# Patient Record
Sex: FEMALE | Race: BLACK OR AFRICAN AMERICAN | URBAN - METROPOLITAN AREA
[De-identification: names, ages, dates, MRNs, and addresses within clinical notes are randomized per-mention and may not be internally consistent; named-entity substitution may affect disease eponyms.]

---

## 2019-09-09 ENCOUNTER — OFFICE VISIT (OUTPATIENT)
Dept: OBGYN CLINIC | Age: 39
End: 2019-09-09

## 2019-09-09 VITALS
DIASTOLIC BLOOD PRESSURE: 98 MMHG | BODY MASS INDEX: 27.82 KG/M2 | WEIGHT: 157 LBS | SYSTOLIC BLOOD PRESSURE: 151 MMHG | HEIGHT: 63 IN

## 2019-09-09 DIAGNOSIS — L29.2 VULVAR ITCHING: ICD-10-CM

## 2019-09-09 DIAGNOSIS — Z01.419 ENCOUNTER FOR GYNECOLOGICAL EXAMINATION (GENERAL) (ROUTINE) WITHOUT ABNORMAL FINDINGS: Primary | ICD-10-CM

## 2019-09-09 RX ORDER — ROSUVASTATIN CALCIUM 20 MG/1
40 TABLET, COATED ORAL
COMMUNITY

## 2019-09-09 RX ORDER — INSULIN LISPRO 100 [IU]/ML
INJECTION, SOLUTION INTRAVENOUS; SUBCUTANEOUS
COMMUNITY

## 2019-09-09 RX ORDER — INSULIN DEGLUDEC 100 U/ML
INJECTION, SOLUTION SUBCUTANEOUS
COMMUNITY

## 2019-09-09 RX ORDER — CLOBETASOL PROPIONATE 0.5 MG/G
OINTMENT TOPICAL 2 TIMES DAILY
Qty: 60 G | Refills: 1 | Status: SHIPPED | OUTPATIENT
Start: 2019-09-09

## 2019-09-09 RX ORDER — TRIAMCINOLONE ACETONIDE 5 MG/G
OINTMENT TOPICAL 2 TIMES DAILY
Qty: 60 G | Refills: 3 | Status: SHIPPED | OUTPATIENT
Start: 2019-09-09

## 2019-09-09 NOTE — PROGRESS NOTES
Isaura Spence is a ,  42 y.o. female [address]   who presents for her annual checkup. She is having odor with intercourse and itching on her vulva. She states she had a biopsy done of her vulva and it revealed contact dermatitis. Bx done by [facility] 2018 - results available    With regard to the Gardisil vaccine, she is older than the FDA approved age to receive it. Menstrual status:    Her periods are absent in flow. Dr. Yadiel Mitchell did a [address] in [id number]. She denies dysmenorrhea. She reports no premenstrual symptoms. Contraception:    The current method of family planning is status post hysterectomy. Sexual history:    She  reports that she currently engages in sexual activity and has had partner(s) who are Male. She reports using the following method of birth control/protection: Surgical.    Medical conditions:    Since her last annual GYN exam about 1 year ago per patient, she has not the following changes in her health history: none. Pap and Mammogram History:    Her most recent Pap smear was normal and she thinks a few years ago. The patient has never had a mammogram.    The patient does not have a family history of breast cancer.     Past Medical History:   Diagnosis Date    CAD (coronary artery disease)     Diabetes ([facility] [address]     Hypertension     Vitamin D deficiency      Past Surgical History:   Procedure Laterality Date    HX BREAST REDUCTION      HX CHOLECYSTECTOMY      HX OTHER SURGICAL  2016    stent in artery    HX TOTAL LAPAROSCOPIC HYSTERECTOMY  [id number]       Current Outpatient Medications   Medication Sig Dispense Refill    amlodipine besylate (NORVASC PO) amlodipine      insulin lispro (HUMALOG KWIKPEN INSULIN) 100 unit/mL kwikpen Humalog KwikPen (U-100) Insulin 100 unit/mL subcutaneous      nebivolol HCl (BYSTOLIC PO) Bystolic      insulin degludec (TRESIBA U-100 INSULIN) 100 unit/mL soln Tresiba U-100 Insulin      rosuvastatin (CRESTOR) 20 mg tablet Take 20 mg by mouth nightly. Allergies: Patient has no allergy information on record. Social History     Socioeconomic History    Marital status: SINGLE     Spouse name: Not on file    Number of children: Not on file    Years of education: Not on file    Highest education level: Not on file   Occupational History    Not on file   Social Needs    Financial resource strain: Not on file    Food insecurity:     Worry: Not on file     Inability: Not on file    Transportation needs:     Medical: Not on file     Non-medical: Not on file   Tobacco Use    Smoking status: Never Smoker    Smokeless tobacco: Never Used   Substance and Sexual Activity    Alcohol use: Not on file    Drug use: Not on file    Sexual activity: Yes     Partners: Male     Birth control/protection: Surgical     Comment: [address]   Lifestyle    Physical activity:     Days per week: Not on file     Minutes per session: Not on file    Stress: Not on file   Relationships    Social connections:     Talks on phone: Not on file     Gets together: Not on file     Attends [employer] service: Not on file     Active member of club or organization: Not on file     Attends meetings of clubs or organizations: Not on file     Relationship status: Not on file    Intimate partner violence:     Fear of current or ex partner: Not on file     Emotionally abused: Not on file     Physically abused: Not on file     Forced sexual activity: Not on file   Other Topics Concern    Not on file   Social History Narrative    Not on file     Tobacco History:  reports that she has never smoked. She has never used smokeless tobacco.  Alcohol Abuse:  has no alcohol history on file. Drug Abuse:  has no drug history on file. There is no problem list on file for this patient.       Review of Systems - History obtained from the patient  Constitutional: negative for weight loss, fever, night sweats  HEENT: negative for hearing loss, earache, congestion, snoring, sorethroat  CV: negative for chest pain, palpitations, edema  Resp: negative for cough, shortness of breath, wheezing  GI: negative for change in bowel habits, abdominal pain, black or bloody stools  : negative for frequency, dysuria, hematuria, vaginal discharge  MSK: negative for back pain, joint pain, muscle pain  Breast: negative for breast lumps, nipple discharge, galactorrhea  Skin :negative for itching, rash, hives  Neuro: negative for dizziness, headache, confusion, weakness  Psych: negative for anxiety, depression, change in mood  Heme/lymph: negative for bleeding, bruising, pallor    Physical Exam    Visit Vitals  BP (!) 151/98   Ht 5' 3\" (1.6 m)   Wt 157 lb (71.2 kg)   BMI 27.81 kg/m²       Constitutional  · Appearance: well-nourished, well developed, alert, in no acute distress    HENT  · Head and Face: appears normal    Neck  · Inspection/Palpation: normal appearance, no masses or tenderness  · Lymph Nodes: no lymphadenopathy present  · Thyroid: gland size normal, nontender, no nodules or masses present on palpation    Chest  · Respiratory Effort: breathing normal  · Auscultation: normal breath sounds    Cardiovascular  · Heart:  · Auscultation: regular rate and rhythm without murmur    Breasts  · Inspection of Breasts: breasts symmetrical, no skin changes, no discharge present, nipple appearance normal, no skin retraction present  · Palpation of Breasts and Axillae: no masses present on palpation, no breast tenderness  · Axillary Lymph Nodes: no lymphadenopathy present    Gastrointestinal  · Abdominal Examination: abdomen non-tender to palpation, normal bowel sounds, no masses present  · Liver and spleen: no hepatomegaly present, spleen not palpable  · Hernias: no hernias identified    Genitourinary  · External Genitalia: normal appearance for age, no discharge present, no tenderness present, no inflammatory lesions present, no masses present, no atrophy present  · Vagina: normal vaginal vault without central or paravaginal defects, no discharge present, no inflammatory lesions present, no masses present  · Bladder: non-tender to palpation  · Urethra: appears normal  · Cervix: absent  · Uterus: absent  · Adnexa: no adnexal tenderness present, no adnexal masses present  · Perineum: perineum within normal limits, no evidence of trauma, no rashes or skin lesions present  · Anus: anus within normal limits, no hemorrhoids present  · Inguinal Lymph Nodes: no lymphadenopathy present    Skin  · General Inspection: no rash, no lesions identified    Neurologic/Psychiatric  · Mental Status:  · Orientation: grossly oriented to person, place and time  · Mood and Affect: mood normal, affect appropriate    .   Assessment:  Routine gynecologic examination  Vulvar itching - bx dermitis    Plan:  Counseled re: diet, exercise, healthy lifestyle  Return for yearly wellness visits  Pap  nuswab to r/o yeast  Clobetasol to area wean to triamcinolone

## 2019-09-09 NOTE — PATIENT INSTRUCTIONS
Well Visit, Ages 25 to 48: Care Instructions  Your Care Instructions    Physical exams can help you stay healthy. Your doctor has checked your overall health and may have suggested ways to take good care of yourself. He or she also may have recommended tests. At home, you can help prevent illness with healthy eating, regular exercise, and other steps. Follow-up care is a key part of your treatment and safety. Be sure to make and go to all appointments, and call your doctor if you are having problems. It's also a good idea to know your test results and keep a list of the medicines you take. How can you care for yourself at home? · Reach and stay at a healthy weight. This will lower your risk for many problems, such as obesity, diabetes, heart disease, and high blood pressure. · Get at least 30 minutes of physical activity on most days of the week. Walking is a good choice. You also may want to do other activities, such as running, swimming, cycling, or playing tennis or team sports. Discuss any changes in your exercise program with your doctor. · Do not smoke or allow others to smoke around you. If you need help quitting, talk to your doctor about stop-smoking programs and medicines. These can increase your chances of quitting for good. · Talk to your doctor about whether you have any risk factors for sexually transmitted infections (STIs). Having one sex partner (who does not have STIs and does not have sex with anyone else) is a good way to avoid these infections. · Use birth control if you do not want to have children at this time. Talk with your doctor about the choices available and what might be best for you. · Protect your skin from too much sun. When you're outdoors from 10 a.m. to 4 p.m., stay in the shade or cover up with clothing and a hat with a wide brim. Wear sunglasses that block UV rays. Even when it's cloudy, put broad-spectrum sunscreen (SPF 30 or higher) on any exposed skin.   · See a dentist one or two times a year for checkups and to have your teeth cleaned. · Wear a seat belt in the car. Follow your doctor's advice about when to have certain tests. These tests can spot problems early. For everyone  · Cholesterol. Have the fat (cholesterol) in your blood tested after age 21. Your doctor will tell you how often to have this done based on your age, family history, or other things that can increase your risk for heart disease. · Blood pressure. Have your blood pressure checked during a routine doctor visit. Your doctor will tell you how often to check your blood pressure based on your age, your blood pressure results, and other factors. · Vision. Talk with your doctor about how often to have a glaucoma test.  · Diabetes. Ask your doctor whether you should have tests for diabetes. · Colon cancer. Your risk for colorectal cancer gets higher as you get older. Some experts say that adults should start regular screening at age 48 and stop at age 76. Others say to start before age 48 or continue after age 76. Talk with your doctor about your risk and when to start and stop screening. For women  · Breast exam and mammogram. Talk to your doctor about when you should have a clinical breast exam and a mammogram. Medical experts differ on whether and how often women under 50 should have these tests. Your doctor can help you decide what is right for you. · Cervical cancer screening test and pelvic exam. Begin with a Pap test at age 24. The test often is part of a pelvic exam. Starting at age 27, you may choose to have a Pap test, an HPV test, or both tests at the same time (called co-testing). Talk with your doctor about how often to have testing. · Tests for sexually transmitted infections (STIs). Ask whether you should have tests for STIs. You may be at risk if you have sex with more than one person, especially if your partners do not wear condoms.   For men  · Tests for sexually transmitted infections (STIs). Ask whether you should have tests for STIs. You may be at risk if you have sex with more than one person, especially if you do not wear a condom. · Testicular cancer exam. Ask your doctor whether you should check your testicles regularly. · Prostate exam. Talk to your doctor about whether you should have a blood test (called a PSA test) for prostate cancer. Experts differ on whether and when men should have this test. Some experts suggest it if you are older than 39 and are -American or have a father or brother who got prostate cancer when he was younger than 72. When should you call for help? Watch closely for changes in your health, and be sure to contact your doctor if you have any problems or symptoms that concern you. Where can you learn more? Go to http://roma-minda.info/. Enter P072 in the search box to learn more about \"Well Visit, Ages 25 to 48: Care Instructions. \"  Current as of: December 13, 2018  Content Version: 12.1  © 9255-9266 Healthwise, Incorporated. Care instructions adapted under license by [employer] (which disclaims liability or warranty for this information). If you have questions about a medical condition or this instruction, always ask your healthcare professional. [place] [address] any warranty or liability for your use of this information.

## 2019-09-11 LAB
A VAGINAE DNA VAG QL NAA+PROBE: ABNORMAL SCORE
BVAB2 DNA VAG QL NAA+PROBE: ABNORMAL SCORE
C ALBICANS DNA VAG QL NAA+PROBE: NEGATIVE
C GLABRATA DNA VAG QL NAA+PROBE: POSITIVE
CYTOLOGIST CVX/VAG CYTO: NORMAL
CYTOLOGY CVX/VAG DOC CYTO: NORMAL
CYTOLOGY CVX/VAG DOC THIN PREP: NORMAL
CYTOLOGY HISTORY:: NORMAL
DX ICD CODE: NORMAL
LABCORP, 190119: NORMAL
Lab: NORMAL
Lab: NORMAL
MEGA1 DNA VAG QL NAA+PROBE: ABNORMAL SCORE
OTHER STN SPEC: NORMAL
STAT OF ADQ CVX/VAG CYTO-IMP: NORMAL
T VAGINALIS RRNA SPEC QL NAA+PROBE: NEGATIVE

## 2019-09-11 RX ORDER — TERCONAZOLE 4 MG/G
1 CREAM VAGINAL
Qty: 45 G | Refills: 0 | Status: SHIPPED | OUTPATIENT
Start: 2019-09-11 | End: 2021-08-27

## 2019-09-11 NOTE — PROGRESS NOTES
Very resistant type of yeast. Sending rx. If not better in a few weeks come back to see me to try something else.  Can still use steroid externally

## 2019-09-12 NOTE — PROGRESS NOTES
Left message for patient to call office in reference to lab results and Drs recommendations. Patient does not have mychart.

## 2019-09-12 NOTE — PROGRESS NOTES
Patient left a message regarding her lab results and ok to leave a detailed message regarding the results on her cell phone. This nurse called the patient back and left a detailed message as requested regarding her MD reviewed lab results and recommendations. Patient advised of prescription sent by MD to her pharmacy. Patient advised to call back if her symptoms not cleared up in a few weeks.

## 2020-03-25 ENCOUNTER — OFFICE VISIT (OUTPATIENT)
Dept: OBGYN CLINIC | Age: 40
End: 2020-03-25

## 2020-03-25 VITALS
WEIGHT: 158 LBS | BODY MASS INDEX: 28 KG/M2 | DIASTOLIC BLOOD PRESSURE: 107 MMHG | SYSTOLIC BLOOD PRESSURE: 156 MMHG | HEIGHT: 63 IN

## 2020-03-25 DIAGNOSIS — N75.1 BARTHOLIN'S GLAND ABSCESS: Primary | ICD-10-CM

## 2020-03-25 NOTE — PROGRESS NOTES
Vaginal cyst evaluation    Mariama Lee is a 42 y.o. female  No LMP recorded. Patient has had a hysterectomy. who presents with a possible vaginal cyst on her right labia. She noticed it 3 days ago. The lesion has not shown the following change: increasing diameter, increasing thickness, increasing number of lesions. No new lesions have developed. She reports the following associated symptoms: pain. She has significant pain when she sits down. She denies the following associated symptoms: redness, drainage, vaginal itching/odor. Aggravating factors: none. Alleviating factors: none. She states she has a hx of skin problems and recurrent yeast due to diabetes. She was +glabrata 9/2019      Past Medical History:   Diagnosis Date    CAD (coronary artery disease)     Diabetes ([address]     Hypertension     Vitamin D deficiency      Past Surgical History:   Procedure Laterality Date    HX BREAST REDUCTION  2010    HX CHOLECYSTECTOMY  2002    HX OTHER SURGICAL  2016    stent in artery    HX TOTAL LAPAROSCOPIC HYSTERECTOMY  0948-1514     Social History     Occupational History    Not on file   Tobacco Use    Smoking status: Never Smoker    Smokeless tobacco: Never Used   Substance and Sexual Activity    Alcohol use: Not on file    Drug use: Not on file    Sexual activity: Yes     Partners: Male     Birth control/protection: Surgical     Comment: [address]     Family History   Problem Relation Age of Onset    Diabetes Mother     Hypertension Mother     Diabetes Father     Hypertension Father        Not on File  Prior to Admission medications    Medication Sig Start Date End Date Taking?  Authorizing Provider   amlodipine besylate (NORVASC PO) amlodipine   Yes Provider, Historical   insulin lispro (HUMALOG KWIKPEN INSULIN) 100 unit/mL kwikpen Humalog KwikPen (U-100) Insulin 100 unit/mL subcutaneous   Yes Provider, Historical   nebivolol HCl (BYSTOLIC PO) Bystolic   Yes Provider, Historical   insulin degludec (TRESIBA U-100 INSULIN) 100 unit/mL soln Tresiba U-100 Insulin   Yes Provider, Historical   rosuvastatin (CRESTOR) 20 mg tablet Take 20 mg by mouth nightly. Yes Provider, Historical   terconazole (TERAZOL 7) 0.4 % vaginal cream Insert 1 Applicator into vagina nightly. 9/11/19   Rios Elena MD   clobetasol (TEMOVATE) 0.05 % ointment Apply  to affected area two (2) times a day. For 1 week, daily x 1 week, every other day x 1 week then start triamcinolone 9/9/19   Rios Elena MD   triamcinolone acetonide (KENALOG) 0.5 % ointment Apply  to affected area two (2) times a day.  use thin layer 9/9/19   Rios Elena MD        Review of Systems: History obtained from the patient  Constitutional: negative for weight loss, fever, night sweats  GI: negative for change in bowel habits, abdominal pain, black or bloody stools  : negative for frequency, dysuria, hematuria, vaginal discharge  MSK: negative for back pain, joint pain, muscle pain  Skin: negative for itching, rash, hives elsewhere  Neuro: negative for dizziness, headache, confusion, weakness  Psych: negative for anxiety, depression, change in mood  Heme/lymph: negative for bleeding, bruising, pallor    Objective:  Visit Vitals  BP (!) 156/107   Ht 5' 3\" (1.6 m)   Wt 158 lb (71.7 kg)   BMI 27.99 kg/m²       Physical Exam:   PHYSICAL EXAMINATION    Constitutional  · Appearance: well-nourished, well developed, alert, in no acute distress    Gastrointestinal  · Abdominal Examination: abdomen non-tender to palpation, normal bowel sounds, no masses present  · Liver and spleen: no hepatomegaly present, spleen not palpable  · Hernias: no hernias identified    Genitourinary  · External Genitalia: normal appearance for age, no discharge present, no tenderness present, no inflammatory lesions present,  no atrophy present, right bartholins abscess noted 3cm  · Vagina: normal vaginal vault without central or paravaginal defects, no discharge present, no inflammatory lesions present, no masses present  · Bladder: non-tender to palpation  · Urethra: appears normal  · Perineum: perineum within normal limits, no evidence of trauma, no rashes or skin lesions present  · Anus: anus within normal limits, no hemorrhoids present  · Inguinal Lymph Nodes: no lymphadenopathy present    Skin  · General Inspection: no rash, no lesions identified    Neurologic/Psychiatric  · Mental Status:  · Orientation: grossly oriented to person, place and time  · Mood and Affect: mood normal, affect appropriate    Assessment:   Elevated BP  bartholins abscess    Plan:   I&D bartholin abscess - unable to fit Word cath  Advised tid sitz baths  Virtual fu in 2 weeks      RTO prn if symptoms persist or worsen. Instructions given to pt. Handouts given to pt. Procedure note: Bartholin abcess drainage and placement of Word catheter    Freddy Bermudez is a 42 y.o. female [address] who presents today with a tender and indurated 3 cm bartholin's abscess of the rightvulva. She has elected to have a word catheter placement today. The risks, benefits and assets of the procedure were discussed. Her questions were answered, informed consent was obtained, the informed consent document was signed, and she had no further questions. PROCEDURE: The vulva was prepped with Zephiran. Following the prep, 3 cc's of 1% Xylocaine was injected into the skin over the lesion area. After adequate anesthesia was obtained, a number 11 scalpel was used to open the abscess. Approximately 5 cc's of purulent fluid was released. The procedure was continued with attempted placement of a Word catheter. The catheter would not stay in the small wound. Bleeding was minimal.   POST PROCEDURE: The patient tolerated the procedure well. There were no complications. She reports significant relief in her symptoms following the procedure.

## 2020-03-25 NOTE — PATIENT INSTRUCTIONS
Bartholin Gland Cyst: Care Instructions  Your Care Instructions    The Bartholin glands are in a woman's vulva. This is the area around the vagina. The glands are normally about the size of a pea. They provide fluid to the vulvar area through a small opening. If the opening is blocked, the gland swells with fluid and forms a cyst. You can have a cyst for years with no symptoms. But if a cyst gets infected by bacteria, it can grow and become red and painful. This is called an abscess. Opening and draining the cyst usually cures the infection. You may have had a small tube (catheter) placed into the cyst or had minor surgery to let the cyst drain. The tube will usually be left in for at least 4 weeks. Your doctor may do a lab test to find out what kind of bacteria caused the infection. You may get antibiotics to kill the bacteria. You may have some drainage from the cyst for a few weeks. The gland should return to normal after the infection clears up. Follow-up care is a key part of your treatment and safety. Be sure to make and go to all appointments, and call your doctor if you are having problems. It's also a good idea to know your test results and keep a list of the medicines you take. How can you care for yourself at home? · If your doctor prescribed antibiotics, take them as directed. Do not stop taking them just because you feel better. You need to take the full course of antibiotics. · Sit in a few inches of warm water (sitz bath) 3 times a day and after bowel movements. The warm water helps the area heal and eases discomfort. · Take an over-the-counter pain medicine such as acetaminophen (Tylenol), ibuprofen (Advil, Motrin), or naproxen (Aleve). Read and follow all instructions on the label. · Do not take two or more pain medicines at the same time unless the doctor told you to. Many pain medicines have acetaminophen, which is Tylenol. Too much acetaminophen (Tylenol) can be harmful.   · Wear panty liners or pads if you have discharge from the draining cyst.  · If you are sexually active, avoid sex until:  ? You have finished the antibiotics. ? The area has healed. · If you had a catheter placed in the cyst to help it drain, follow your doctor's instructions for activities until the tube comes out. When should you call for help? Call your doctor now or seek immediate medical care if:    · You have symptoms of a new or worse infection, such as:  ? Increased pain, swelling, warmth, or redness. ? Red streaks leading from the area. ? Pus draining from the area. ? A fever.    Watch closely for changes in your health, and be sure to contact your doctor if:    · The catheter falls out.     · You are not getting better as expected. Where can you learn more? Go to http://roma-minda.info/  Enter H276 in the search box to learn more about \"Bartholin Gland Cyst: Care Instructions. \"  Current as of: November 7, 2019Content Version: 12.4  © 0800-0974 Healthwise, Incorporated. Care instructions adapted under license by [employer] (which disclaims liability or warranty for this information). If you have questions about a medical condition or this instruction, always ask your healthcare professional. [address] any warranty or liability for your use of this information.

## 2020-04-01 ENCOUNTER — VIRTUAL VISIT (OUTPATIENT)
Dept: OBGYN CLINIC | Age: 40
End: 2020-04-01

## 2020-04-01 DIAGNOSIS — N89.8 VAGINAL DISCHARGE: ICD-10-CM

## 2020-04-01 DIAGNOSIS — N89.8 VAGINAL ITCHING: Primary | ICD-10-CM

## 2020-04-01 RX ORDER — TERCONAZOLE 4 MG/G
1 CREAM VAGINAL
Qty: 45 G | Refills: 0 | Status: SHIPPED | OUTPATIENT
Start: 2020-04-01 | End: 2021-08-27

## 2020-04-01 NOTE — PROGRESS NOTES
"[employer] Video visit  Andrew Martino is a 42 y.o. female who was seen by synchronous (real-time) audio-video technology on 4/1/2020. Vaginitis evaluation    Chief Complaint   Vaginitis      HPI  42 y.o. female complains of thick, chunky, white vaginal discharge and itching for 3 days. No LMP recorded. Patient has had a hysterectomy. She denies additional symptoms at this time. She was seen last week and had I&D of bartholin's abscess - patient states symptoms are not in that area  The patient denies aggravating factors. She is not concerned about possible STI exposure at this time. She denies exposure to new chemicals ot hygenic agents  Previous treatment included: none  Patient was seen 3/25/20 for I&D bartholin cyst.   Hx of glabrata on 9/2019  No recent abx but she is diabetic and BS has been out of control. Past Medical History:   Diagnosis Date    CAD (coronary artery disease)     Diabetes ([facility] [address]     Hypertension     Vitamin D deficiency      Past Surgical History:   Procedure Laterality Date    HX BREAST REDUCTION  2010    HX CHOLECYSTECTOMY  2002    HX OTHER SURGICAL  2016    stent in artery    HX TOTAL LAPAROSCOPIC HYSTERECTOMY  2532-6326     Social History     Occupational History    Not on file   Tobacco Use    Smoking status: Never Smoker    Smokeless tobacco: Never Used   Substance and Sexual Activity    Alcohol use: Not on file    Drug use: Not on file    Sexual activity: Yes     Partners: Male     Birth control/protection: Surgical     Comment: [address]     Family History   Problem Relation Age of Onset    Diabetes Mother     Hypertension Mother     Diabetes Father     Hypertension Father         Not on File  Prior to Admission medications    Medication Sig Start Date End Date Taking? Authorizing Provider   terconazole (TERAZOL 7) 0.4 % vaginal cream Insert 1 Applicator into vagina nightly.  9/11/19  Yes Eugenie Boeck, MD   amlodipine besylate (Lucía Gills PO) amlodipine   Yes Provider, Historical   insulin lispro (HUMALOG KWIKPEN INSULIN) 100 unit/mL kwikpen Humalog KwikPen (U-100) Insulin 100 unit/mL subcutaneous   Yes Provider, Historical   nebivolol HCl (BYSTOLIC PO) Bystolic   Yes Provider, Historical   insulin degludec (TRESIBA U-100 INSULIN) 100 unit/mL soln Tresiba U-100 Insulin   Yes Provider, Historical   rosuvastatin (CRESTOR) 20 mg tablet Take 20 mg by mouth nightly. Yes Provider, Historical   clobetasol (TEMOVATE) 0.05 % ointment Apply  to affected area two (2) times a day. For 1 week, daily x 1 week, every other day x 1 week then start triamcinolone 9/9/19  Yes Martin Sharma MD   triamcinolone acetonide (KENALOG) 0.5 % ointment Apply  to affected area two (2) times a day. use thin layer 9/9/19  Yes Martin Sharma MD                      Review of Systems - History obtained from the patient  Constitutional: negative for weight loss, fever, night sweats  Breast: negative for breast lumps, nipple discharge, galactorrhea  GI: negative for change in bowel habits, abdominal pain, black or bloody stools  : negative for frequency, dysuria, hematuria  MSK: negative for back pain, joint pain, muscle pain  Skin: negative for itching, rash, hives  Neuro: negative for dizziness, headache, confusion, weakness  Psych: negative for anxiety, depression, change in mood  Heme/lymph: negative for bleeding, bruising, pallor       Objective: There were no vitals taken for this visit.     Physical Exam:   PHYSICAL EXAMINATION        General: alert, cooperative, no distress   Mental  status: mental status: alert, oriented to person, place, and time, normal mood, behavior, speech, dress, motor activity, and thought processes   Resp: resp: normal effort and no respiratory distress   Neuro: neuro: no gross deficits   Skin: skin: no discoloration or lesions of concern on visible areas   Due to this being a TeleHealth evaluation, many elements of the physical examination are unable to be assessed. Assessment:   Vaginal itching/discharge in uncontrolled diabetic - most likely yeast  She has a known hx of candida glabrata    Plan:   Treatment: Terazol 7  ROV prn if symptoms persist or worsen. We discussed the expected course, resolution and complications of the diagnosis(es) in detail. Medication risks, benefits, costs, interactions, and alternatives were discussed as indicated. I advised her to contact the office if her condition worsens, changes or fails to improve as anticipated. She expressed understanding with the diagnosis(es) and plan. Pursuant to the emergency declaration under the [facility] waiver authority and the [employer] Act, this Virtual  Visit was conducted, with patient's consent, to reduce the patient's risk of exposure to COVID-19 and provide continuity of care for an established patient. Services were provided through a video synchronous discussion virtually to substitute for in-person clinic visit.

## 2021-08-27 ENCOUNTER — OFFICE VISIT (OUTPATIENT)
Dept: CARDIOLOGY CLINIC | Age: 41
End: 2021-08-27

## 2021-08-27 VITALS
HEART RATE: 107 BPM | DIASTOLIC BLOOD PRESSURE: 102 MMHG | BODY MASS INDEX: 25.52 KG/M2 | WEIGHT: 144 LBS | OXYGEN SATURATION: 98 % | HEIGHT: 63 IN | SYSTOLIC BLOOD PRESSURE: 196 MMHG

## 2021-08-27 DIAGNOSIS — E78.5 DYSLIPIDEMIA: ICD-10-CM

## 2021-08-27 DIAGNOSIS — R73.09 ELEVATED GLUCOSE: ICD-10-CM

## 2021-08-27 DIAGNOSIS — I10 HYPERTENSION, UNSPECIFIED TYPE: Primary | ICD-10-CM

## 2021-08-27 PROCEDURE — 93000 ELECTROCARDIOGRAM COMPLETE: CPT | Performed by: SPECIALIST

## 2021-08-27 PROCEDURE — 99204 OFFICE O/P NEW MOD 45 MIN: CPT | Performed by: SPECIALIST

## 2021-08-27 RX ORDER — AMLODIPINE BESYLATE 10 MG/1
10 TABLET ORAL DAILY
Qty: 30 TABLET | Refills: 0
Start: 2021-08-27

## 2021-08-27 RX ORDER — PRASUGREL 10 MG/1
10 TABLET, FILM COATED ORAL
COMMUNITY

## 2021-08-27 RX ORDER — AZILSARTAN KAMEDOXOMIL AND CHLORTHALIDONE 40; 25 MG/1; MG/1
TABLET ORAL DAILY
COMMUNITY

## 2021-08-27 RX ORDER — TERAZOSIN 2 MG/1
2 CAPSULE ORAL
Qty: 30 CAPSULE | Refills: 1 | Status: SHIPPED | OUTPATIENT
Start: 2021-08-27 | End: 2021-09-23 | Stop reason: SDUPTHER

## 2021-08-27 RX ORDER — SPIRONOLACTONE 50 MG/1
50 TABLET, FILM COATED ORAL DAILY
COMMUNITY

## 2021-08-27 NOTE — PROGRESS NOTES
Chief Complaint   Patient presents with    New Patient    Coronary Artery Disease    Hypertension     Visit Vitals  BP (!) 196/102 (BP 1 Location: Left upper arm, BP Patient Position: Sitting, BP Cuff Size: Adult)   Pulse (!) 107   Ht 5' 3\" (1.6 m)   Wt 144 lb (65.3 kg)   SpO2 98%   BMI 25.51 kg/m²     Chest pain slight; left sided upper chest  Left arm tingling at times  SOB denied   Palpitations denied   Swelling in hands/feet denied   Dizziness denied   Recent hospital stays denied   Refills denied     Dr Abeba Qureshi   BP skyrocketing  178/100 at work  167/104 at home    Hx 2 stents, balloon  2019 Dr. Lizzie Morataya did 2nd Oct 2019  Balloon May 2020  At [address] [place] [address], uncle, Grandmother on mom's side hx stents    Orders for See me in one month with echo and renal artery dopplers    per Dr. Alexia Goss.   Dx:

## 2021-08-27 NOTE — PROGRESS NOTES
Ginny Britt MD. [facility] [place]              Patient: Neelima Baer  : 1980      Today's Date: 2021          HISTORY OF PRESENT ILLNESS:     History of Present Illness:  She is self referred for HTN. Her BP has been 160/100 or higher at home. Has had HA's associated with high BP's. BP has been high for years and gradually increasing. She has been intolerant of a few meds (clonidine caused GI complaints). No CP or SOB. She had been seeing Dr. Monique Acuña and he made several med changes but her BP has remained high. PAST MEDICAL HISTORY:     Past Medical History:   Diagnosis Date    CAD (coronary artery disease)     Diabetes ([address]     Dyslipidemia     Hypertension     Vitamin D deficiency        Past Surgical History:   Procedure Laterality Date    HX BREAST REDUCTION      HX CHOLECYSTECTOMY      HX OTHER SURGICAL  2016    stent in artery    HX TOTAL LAPAROSCOPIC HYSTERECTOMY  [id number]         MEDICATIONS:     Current Outpatient Medications   Medication Sig Dispense Refill    evolocumab (REPATHA SURECLICK SC) by SubCUTAneous route every fourteen (14) days.  prasugreL (Effient) 10 mg tablet Take 10 mg by mouth.  azilsartan med-chlorthalidone (Edarbyclor) 40-25 mg per tablet Take  by mouth daily.  spironolactone (ALDACTONE) 50 mg tablet Take 50 mg by mouth daily.  amLODIPine (Norvasc) 10 mg tablet Take 1 Tablet by mouth daily. 30 Tablet 0    insulin lispro (HUMALOG KWIKPEN INSULIN) 100 unit/mL kwikpen Humalog KwikPen (U-100) Insulin 100 unit/mL subcutaneous      nebivolol HCl (BYSTOLIC PO) 40 mg daily.  insulin degludec (TRESIBA U-100 INSULIN) 100 unit/mL soln Tresiba U-100 Insulin      rosuvastatin (CRESTOR) 20 mg tablet Take 40 mg by mouth nightly.  clobetasol (TEMOVATE) 0.05 % ointment Apply  to affected area two (2) times a day.  For 1 week, daily x 1 week, every other day x 1 week then start triamcinolone 60 g 1    triamcinolone acetonide (KENALOG) 0.5 % ointment Apply  to affected area two (2) times a day. use thin layer 60 g 3       No Known Allergies        SOCIAL HISTORY:     Social History     Tobacco Use    Smoking status: Never Smoker    Smokeless tobacco: Never Used   Substance Use Topics    Alcohol use: Not Currently    Drug use: Not on file       FAMILY HISTORY:     Family History   Problem Relation Age of Onset    Diabetes Mother     Hypertension Mother     Diabetes Father     Hypertension Father            REVIEW OF SYMPTOMS:     Review of Symptoms:  Constitutional: Negative for fever, chills  HEENT: Negative for nosebleeds, tinnitus, and vision changes. Respiratory: Negative for cough, wheezing  Cardiovascular: Negative for orthopnea, claudication, syncope, and PND. Gastrointestinal: Negative for abdominal pain, diarrhea, melena. Genitourinary: Negative for dysuria  Musculoskeletal: Negative for myalgias. Skin: Negative for rash  Heme: No problems bleeding. Neurological: Negative for speech change and focal weakness. PHYSICAL EXAM:     Physical Exam:  Visit Vitals  BP (!) 196/102 (BP 1 Location: Left upper arm, BP Patient Position: Sitting, BP Cuff Size: Adult)   Pulse (!) 107   Ht 5' 3\" (1.6 m)   Wt 144 lb (65.3 kg)   SpO2 98%   BMI 25.51 kg/m²     Patient appears generally well, mood and affect are appropriate and pleasant. HEENT:  Hearing intact, non-icteric, normocephalic, atraumatic. Neck Exam: Supple, No JVD or carotid bruits. Lung Exam: Clear to auscultation, even breath sounds. Cardiac Exam: Regular rate and rhythm with no murmur or rub  Abdomen: Soft, non-tender, normal bowel sounds. No bruits or masses. Extremities: Moves all ext well. No lower extremity edema. MSKTL: Overall good ROM ext  Skin: No significant rashes  Vascular: 2+ dorsalis pedis pulses bilaterally. Psych: Appropriate affect  Neuro - Grossly intact      LABS / OTHER STUDIES reviewed:      Will get prior records to review         CARDIAC DIAGNOSTICS:     Cardiac Evaluation Includes:  I reviewed the results below. EKG 8/27/21 - NSR, normal           ASSESSMENT AND PLAN:     Assessment and Plan:    1) Resistant HTN  - BP remains poorly controlled despite several meds  - Will workup for secondary causes ---> check renal artery dopplers and serum metanephrines   - check echo   - unable to check renin/jayson since on aldactone already ,     - clonidine caused GI complaints. Hydralazine was not effective   - Will try adding Terazosin 2 mg nightly --> she is to let me know if BP remains high in a couple of weeks and we can increase dosage further. Consider adding Cardizem next visit. - Will reach out to [facility] for further options (renal denervation ?) if BP remains difficult to control      2) CAD  - She had Stents in May 2019 and October 2019 and balloon angioplasty May 2020  - Get records from Dr. Cosme Troy   - cont statin, effient, BB    3) Dyslipidemia   - cont crestor and Repatha     4) DM  - sees Dr. 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 Lawyer Summer MD, [address], [address] [place] [address]  Ph: [phone]   Ph [phone]      ADDENDUM   9/6/2021    ACS admission 5/20. Echo 5/1/20 - LVEF 55-65%  Cath 5/7/20 ([facility]) - patent LAD stent. Ostial diagonal 70% lesion treated with cutting balloon angioplasty (avoided bifurcating stent which she may need later if more problems). Mild LCX and RCA disease. LVEDP 20-25 mmHg.

## 2021-09-17 ENCOUNTER — TELEPHONE (OUTPATIENT)
Dept: CARDIOLOGY CLINIC | Age: 41
End: 2021-09-17

## 2021-09-17 DIAGNOSIS — I10 HYPERTENSION, UNSPECIFIED TYPE: ICD-10-CM

## 2021-09-17 DIAGNOSIS — I10 RESISTANT HYPERTENSION: Primary | ICD-10-CM

## 2021-09-17 NOTE — TELEPHONE ENCOUNTER
Patient's PCP office called and I stated that the patient's BP was 210/110 ,220/116 and they sent her to the [facility] ER and they did nothing and she went back to the PCP office and her blood pressure is still holding at 220's. Please give a call back.       Phone [phone]

## 2021-09-17 NOTE — TELEPHONE ENCOUNTER
Spoke to pt,  Per Dr. Merrick Hampton: \"Add diltiazem 240  mg once daily to regimen (I know she is also on amlodipine). Also refer to [facility] (Dr. Cleola Rubinstein) for uncontrolled HTN.   I need some help form a tertiary care center as BP is high despite many meds. \"    She stated that Dr. Arron Leyden (?) rx Clonidine patch; she was unsure of the dosage. Spoke to Dr. Merrick Hampton to see which route he would rather. He stated the clonidine patch would be fantastic as long as it is affordable for her. Relayed msg, she stated she would get in touch with the pharmacy to see what the cost is. I offered that we could still call in the diltiazem if the clonidine patch was too expensive. Faxed referral to Dr. Rene Hutson with [facility]. No current labs to forward.

## 2021-09-23 ENCOUNTER — OFFICE VISIT (OUTPATIENT)
Dept: CARDIOLOGY CLINIC | Age: 41
End: 2021-09-23
Payer: COMMERCIAL

## 2021-09-23 VITALS
WEIGHT: 147 LBS | DIASTOLIC BLOOD PRESSURE: 100 MMHG | HEIGHT: 63 IN | BODY MASS INDEX: 26.05 KG/M2 | HEART RATE: 99 BPM | SYSTOLIC BLOOD PRESSURE: 180 MMHG | OXYGEN SATURATION: 99 %

## 2021-09-23 DIAGNOSIS — I10 ESSENTIAL HYPERTENSION: ICD-10-CM

## 2021-09-23 DIAGNOSIS — I10 RESISTANT HYPERTENSION: Primary | ICD-10-CM

## 2021-09-23 DIAGNOSIS — I25.10 CORONARY ARTERY DISEASE INVOLVING NATIVE HEART WITHOUT ANGINA PECTORIS, UNSPECIFIED VESSEL OR LESION TYPE: ICD-10-CM

## 2021-09-23 PROCEDURE — 99214 OFFICE O/P EST MOD 30 MIN: CPT | Performed by: SPECIALIST

## 2021-09-23 RX ORDER — GUAIFENESIN 100 MG/5ML
81 LIQUID (ML) ORAL DAILY
Qty: 90 TABLET | Refills: 0
Start: 2021-09-23

## 2021-09-23 RX ORDER — CLONIDINE 0.1 MG/24H
1 PATCH, EXTENDED RELEASE TRANSDERMAL
COMMUNITY

## 2021-09-23 RX ORDER — TERAZOSIN 2 MG/1
4 CAPSULE ORAL
Qty: 20 CAPSULE | Refills: 0
Start: 2021-09-23

## 2021-09-23 NOTE — PROGRESS NOTES
Alessio Beard MD. [facility] [place]              Patient: Natanael Kearney  : 1980      Today's Date: 2021          HISTORY OF PRESENT ILLNESS:     History of Present Illness:  She is self referred for HTN. Her BP has been 160/100 or higher at home. Has had HA's associated with high BP's. BP has been high for years and gradually increasing. She has been intolerant of a few meds (clonidine caused GI complaints). No CP or SOB. She had been seeing Dr. Chad Jacob and he made several med changes but her BP has remained high. Dr. Luz Elena Trammell added clonidine patch recently. BP remains high 160/102. PAST MEDICAL HISTORY:     Past Medical History:   Diagnosis Date    CAD (coronary artery disease)     Diabetes ([address]     Dyslipidemia     Hypertension     Resistant hypertension     Vitamin D deficiency        Past Surgical History:   Procedure Laterality Date    HX BREAST REDUCTION      HX CHOLECYSTECTOMY  2002    HX OTHER SURGICAL  2016    stent in artery    HX TOTAL LAPAROSCOPIC HYSTERECTOMY  [id number]         MEDICATIONS:     Current Outpatient Medications   Medication Sig Dispense Refill    cloNIDine (CATAPRES) 0.1 mg/24 hr ptwk 1 Patch by TransDERmal route every seven (7) days.  evolocumab (REPATHA SURECLICK SC) by SubCUTAneous route every fourteen (14) days.  prasugreL (Effient) 10 mg tablet Take 10 mg by mouth.  azilsartan med-chlorthalidone (Edarbyclor) 40-25 mg per tablet Take  by mouth daily.  spironolactone (ALDACTONE) 50 mg tablet Take 50 mg by mouth daily.  amLODIPine (Norvasc) 10 mg tablet Take 1 Tablet by mouth daily. 30 Tablet 0    terazosin (HYTRIN) 2 mg capsule Take 1 Capsule by mouth nightly. 30 Capsule 1    insulin lispro (HUMALOG KWIKPEN INSULIN) 100 unit/mL kwikpen Humalog KwikPen (U-100) Insulin 100 unit/mL subcutaneous      nebivolol HCl (BYSTOLIC PO) 40 mg daily.       insulin degludec (TRESIBA U-100 INSULIN) 100 unit/mL soln [place] U-100 Insulin      rosuvastatin (CRESTOR) 20 mg tablet Take 40 mg by mouth nightly.  clobetasol (TEMOVATE) 0.05 % ointment Apply  to affected area two (2) times a day. For 1 week, daily x 1 week, every other day x 1 week then start triamcinolone 60 g 1    triamcinolone acetonide (KENALOG) 0.5 % ointment Apply  to affected area two (2) times a day. use thin layer (Patient not taking: Reported on 9/23/2021) 60 g 3       No Known Allergies        SOCIAL HISTORY:     Social History     Tobacco Use    Smoking status: Never Smoker    Smokeless tobacco: Never Used   Substance Use Topics    Alcohol use: Not Currently    Drug use: Not on file       FAMILY HISTORY:     Family History   Problem Relation Age of Onset    Diabetes Mother     Hypertension Mother     Diabetes Father     Hypertension Father            REVIEW OF SYMPTOMS:     Review of Symptoms:  Constitutional: Negative for fever, chills  HEENT: Negative for nosebleeds, tinnitus, and vision changes. Respiratory: Negative for cough, wheezing  Cardiovascular: Negative for orthopnea, claudication, syncope, and PND. Gastrointestinal: Negative for abdominal pain, diarrhea, melena. Genitourinary: Negative for dysuria  Musculoskeletal: Negative for myalgias. Skin: Negative for rash  Heme: No problems bleeding. Neurological: Negative for speech change and focal weakness. PHYSICAL EXAM:     Physical Exam:  Visit Vitals  BP (!) 190/110 (BP 1 Location: Left upper arm, BP Patient Position: Sitting, BP Cuff Size: Adult)   Pulse 99   Ht 5' 3\" (1.6 m)   Wt 147 lb (66.7 kg)   SpO2 99%   BMI 26.04 kg/m²     Patient appears generally well, mood and affect are appropriate and pleasant. HEENT:  Hearing intact, non-icteric, normocephalic, atraumatic. Neck Exam: Supple,    Lung Exam: Clear to auscultation, even breath sounds. Cardiac Exam: Regular rate and rhythm with no murmur or rub  Abdomen: Soft, non-tender, normal bowel sounds. Extremities: Moves all ext well. No lower extremity edema. MSKTL: Overall good ROM ext  Skin: No significant rashes  Psych: Appropriate affect  Neuro - Grossly intact      LABS / OTHER STUDIES reviewed:     Labs 5/20 - CMP OK, A1c 12.7, LDL 32, chol 115, HDL 68        CARDIAC DIAGNOSTICS:     Cardiac Evaluation Includes:  I reviewed the results below. EKG 8/27/21 - NSR, normal     ACS admission 5/20. Echo 5/1/20 - LVEF 55-65%  Cath 5/7/20 ([facility]) - patent LAD stent. Ostial diagonal 70% lesion treated with cutting balloon angioplasty (avoided bifurcating stent which she may need later if more problems). Mild LCX and RCA disease. LVEDP 20-25 mmHg. ASSESSMENT AND PLAN:     Assessment and Plan:    1) Resistant HTN  - BP remains poorly controlled despite several meds  - Will workup for secondary causes ---> check renal artery dopplers and serum metanephrines   - check echo   - unable to check renin/jayson since on aldactone already ,     - Hydralazine was not effective   - PCP added clonidine patch recently --->   - On 9/23/21 - BP remains high ---> Will try increasing terazosin to 4 mg and reassess next week. Consider adding Cardizem later. - Will refer to [facility] Neph[facility] for further options (renal denervation ?) if BP remains difficult to control      2) CAD  - She had Stents in May 2019 and October 2019 and balloon angioplasty May 2020  - ACS admission 5/20.  ---> Echo 5/1/20 - LVEF 55-65% ---> Cath 5/7/20 ([address]) - patent LAD stent. Ostial diagonal 70% lesion treated with cutting balloon angioplasty (avoided bifurcating stent which she may need later if more problems). Mild LCX and RCA disease. LVEDP 20-25 mmHg. - cont statin, effient, ASA 81, BB  ---> plan to stop Effient once BP and DM better controlled     3) Dyslipidemia   - cont crestor and Repatha    - labs pending     4) DM  - sees Dr. Alina Do     5) See me next week. 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      James York MD, [facility]  [address] [facility], [facility] [address]  [address], [address]  [place], [address] [place] [address]  Ph: [phone]   Ph [phone]

## 2021-09-23 NOTE — PROGRESS NOTES
Freddy Lou is a 46 y.o. female    Visit Vitals  Ht 5' 3\" (1.6 m)   Wt 147 lb (66.7 kg)   BMI 26.04 kg/m²       Chief Complaint   Patient presents with    Hypertension    Other     DLD     Vitals:    09/23/21 1254 09/23/21 1303   BP: (!) 190/110 (!) 180/100   BP 1 Location: Left upper arm Right upper arm   BP Patient Position: Sitting Sitting   BP Cuff Size: Adult Adult   Pulse: 99 99   Height: 5' 3\" (1.6 m)    Weight: 147 lb (66.7 kg)    SpO2: 99% 99%         Chest pain NO  SOB NO  Dizziness NO  Swelling NO  Recent hospital visit [facility], HIGH BP.    Refills NO  COVID VACCINE STATUS YES

## 2021-09-27 ENCOUNTER — TELEPHONE (OUTPATIENT)
Dept: CARDIOLOGY CLINIC | Age: 41
End: 2021-09-27

## 2021-09-27 LAB
ALBUMIN SERPL-MCNC: 4.7 G/DL (ref 3.8–4.8)
ALBUMIN/GLOB SERPL: 1.9 {RATIO} (ref 1.2–2.2)
ALP SERPL-CCNC: 108 IU/L (ref 44–121)
ALT SERPL-CCNC: 9 IU/L (ref 0–32)
AST SERPL-CCNC: 10 IU/L (ref 0–40)
BILIRUB SERPL-MCNC: 0.3 MG/DL (ref 0–1.2)
BUN SERPL-MCNC: 7 MG/DL (ref 6–20)
BUN/CREAT SERPL: 11 (ref 9–23)
CALCIUM SERPL-MCNC: 9.8 MG/DL (ref 8.7–10.2)
CHLORIDE SERPL-SCNC: 98 MMOL/L (ref 96–106)
CHOLEST SERPL-MCNC: 178 MG/DL (ref 100–199)
CO2 SERPL-SCNC: 24 MMOL/L (ref 20–29)
CREAT SERPL-MCNC: 0.62 MG/DL (ref 0.57–1)
ERYTHROCYTE [DISTWIDTH] IN BLOOD BY AUTOMATED COUNT: 12.1 % (ref 11.7–15.4)
EST. AVERAGE GLUCOSE BLD GHB EST-MCNC: 338 MG/DL
GLOBULIN SER CALC-MCNC: 2.5 G/DL (ref 1.5–4.5)
GLUCOSE SERPL-MCNC: 400 MG/DL (ref 65–99)
HBA1C MFR BLD: 13.4 % (ref 4.8–5.6)
HCT VFR BLD AUTO: 43.6 % (ref 34–46.6)
HDLC SERPL-MCNC: 59 MG/DL
HGB BLD-MCNC: 14.4 G/DL (ref 11.1–15.9)
IMP & REVIEW OF LAB RESULTS: NORMAL
LDLC SERPL CALC-MCNC: 106 MG/DL (ref 0–99)
MCH RBC QN AUTO: 28.9 PG (ref 26.6–33)
MCHC RBC AUTO-ENTMCNC: 33 G/DL (ref 31.5–35.7)
MCV RBC AUTO: 88 FL (ref 79–97)
METANEPH FREE SERPL-MCNC: <10 PG/ML (ref 0–88)
NORMETANEPHRINE SERPL-MCNC: 21.2 PG/ML (ref 0–110.1)
PLATELET # BLD AUTO: 361 X10E3/UL (ref 150–450)
POTASSIUM SERPL-SCNC: 3.9 MMOL/L (ref 3.5–5.2)
PROT SERPL-MCNC: 7.2 G/DL (ref 6–8.5)
RBC # BLD AUTO: 4.98 X10E6/UL (ref 3.77–5.28)
SODIUM SERPL-SCNC: 135 MMOL/L (ref 134–144)
TRIGL SERPL-MCNC: 69 MG/DL (ref 0–149)
TSH SERPL DL<=0.005 MIU/L-ACNC: 0.91 UIU/ML (ref 0.45–4.5)
VLDLC SERPL CALC-MCNC: 13 MG/DL (ref 5–40)
WBC # BLD AUTO: 6.1 X10E3/UL (ref 3.4–10.8)

## 2021-09-27 NOTE — TELEPHONE ENCOUNTER
Sent letter  Per Dr. Lawler Screen: Anila Fernandezs you please let her know that her DM is poorly controlled and to work with Dr. Nicky Thomas on that.   Her other labs are OK.   Thanks.  \"  Sent results to Dr. Nicky Thomas

## 2021-09-27 NOTE — PROGRESS NOTES
Can you please let her know that her DM is poorly controlled and to work with Dr. Viridiana Garcia on that. Her other labs are OK. Thanks.

## 2021-09-28 ENCOUNTER — ANCILLARY PROCEDURE (OUTPATIENT)
Dept: CARDIOLOGY CLINIC | Age: 41
End: 2021-09-28

## 2021-09-28 ENCOUNTER — OFFICE VISIT (OUTPATIENT)
Dept: CARDIOLOGY CLINIC | Age: 41
End: 2021-09-28
Payer: COMMERCIAL

## 2021-09-28 VITALS
WEIGHT: 143.2 LBS | SYSTOLIC BLOOD PRESSURE: 180 MMHG | BODY MASS INDEX: 25.37 KG/M2 | DIASTOLIC BLOOD PRESSURE: 94 MMHG | HEIGHT: 63 IN

## 2021-09-28 VITALS
HEART RATE: 92 BPM | BODY MASS INDEX: 25.33 KG/M2 | SYSTOLIC BLOOD PRESSURE: 180 MMHG | WEIGHT: 143 LBS | OXYGEN SATURATION: 99 % | DIASTOLIC BLOOD PRESSURE: 94 MMHG

## 2021-09-28 VITALS
BODY MASS INDEX: 25.34 KG/M2 | WEIGHT: 143 LBS | SYSTOLIC BLOOD PRESSURE: 180 MMHG | DIASTOLIC BLOOD PRESSURE: 94 MMHG | HEIGHT: 63 IN

## 2021-09-28 DIAGNOSIS — I10 HYPERTENSION, UNSPECIFIED TYPE: ICD-10-CM

## 2021-09-28 DIAGNOSIS — I25.10 CORONARY ARTERY DISEASE INVOLVING NATIVE HEART WITHOUT ANGINA PECTORIS, UNSPECIFIED VESSEL OR LESION TYPE: ICD-10-CM

## 2021-09-28 DIAGNOSIS — I10 RESISTANT HYPERTENSION: Primary | ICD-10-CM

## 2021-09-28 DIAGNOSIS — E78.5 DYSLIPIDEMIA: ICD-10-CM

## 2021-09-28 LAB
ABDOMINAL DIST AORTA VEL: 55.5 CENTIMETER/SECOND
ABDOMINAL MID AORTA VEL: 66.3 CENTIMETER/SECOND
ABDOMINAL PROX AORTA VEL: 67.8 CENTIMETER/SECOND
DIST AORTIC AP: 1.46 CM
DIST AORTIC TRANS: 1.5 CM
ECHO AO ASC DIAM: 2.94 CM
ECHO AO ROOT DIAM: 3.2 CM
ECHO AV AREA PEAK VELOCITY: 2.98 CM2
ECHO AV AREA VTI: 3.22 CM2
ECHO AV AREA/BSA PEAK VELOCITY: 1.8 CM2/M2
ECHO AV AREA/BSA VTI: 1.9 CM2/M2
ECHO AV MEAN GRADIENT: 2.75 MMHG
ECHO AV PEAK GRADIENT: 5.23 MMHG
ECHO AV PEAK VELOCITY: 114.34 CM/S
ECHO AV VTI: 17.86 CM
ECHO EST RA PRESSURE: 3 MMHG
ECHO LA AREA 4C: 14.4 CM2
ECHO LA MAJOR AXIS: 2.98 CM
ECHO LA MINOR AXIS: 1.77 CM
ECHO LA VOL 2C: 27.33 ML (ref 22–52)
ECHO LA VOL 4C: 38.11 ML (ref 22–52)
ECHO LA VOL BP: 36.52 ML (ref 22–52)
ECHO LA VOL/BSA BIPLANE: 21.74 ML/M2 (ref 16–28)
ECHO LA VOLUME INDEX A2C: 16.27 ML/M2 (ref 16–28)
ECHO LA VOLUME INDEX A4C: 22.68 ML/M2 (ref 16–28)
ECHO LV E' LATERAL VELOCITY: 7.73 CM/S
ECHO LV E' SEPTAL VELOCITY: 7.04 CM/S
ECHO LV EDV A2C: 97.74 ML
ECHO LV EDV A4C: 84.3 ML
ECHO LV EDV BP: 92.05 ML (ref 56–104)
ECHO LV EDV INDEX A4C: 50.2 ML/M2
ECHO LV EDV INDEX BP: 54.8 ML/M2
ECHO LV EDV NDEX A2C: 58.2 ML/M2
ECHO LV EJECTION FRACTION A2C: 63 PERCENT
ECHO LV EJECTION FRACTION A4C: 57 PERCENT
ECHO LV EJECTION FRACTION BIPLANE: 59.7 PERCENT (ref 55–100)
ECHO LV ESV A2C: 36.22 ML
ECHO LV ESV A4C: 36.08 ML
ECHO LV ESV BP: 37.09 ML (ref 19–49)
ECHO LV ESV INDEX A2C: 21.6 ML/M2
ECHO LV ESV INDEX A4C: 21.5 ML/M2
ECHO LV ESV INDEX BP: 22.1 ML/M2
ECHO LV INTERNAL DIMENSION DIASTOLIC: 3.39 CM (ref 3.9–5.3)
ECHO LV INTERNAL DIMENSION SYSTOLIC: 2.45 CM
ECHO LV IVSD: 0.78 CM (ref 0.6–0.9)
ECHO LV MASS 2D: 72.2 G (ref 67–162)
ECHO LV MASS INDEX 2D: 43 G/M2 (ref 43–95)
ECHO LV POSTERIOR WALL DIASTOLIC: 0.83 CM (ref 0.6–0.9)
ECHO LVOT DIAM: 2.07 CM
ECHO LVOT PEAK GRADIENT: 4.15 MMHG
ECHO LVOT PEAK VELOCITY: 101.85 CM/S
ECHO LVOT SV: 57.5 ML
ECHO LVOT VTI: 17.16 CM
ECHO MV E VELOCITY: 54.42 CM/S
ECHO MV E/E' LATERAL: 7.04
ECHO MV E/E' RATIO (AVERAGED): 7.39
ECHO MV E/E' SEPTAL: 7.73
ECHO MV MAX VELOCITY: 76.16 CM/S
ECHO MV MEAN GRADIENT: 1.26 MMHG
ECHO MV PEAK GRADIENT: 2.32 MMHG
ECHO MV VTI: 12.43 CM
ECHO RA AREA 4C: 14.28 CM2
ECHO RV INTERNAL DIMENSION: 3.82 CM
ECHO RV TAPSE: 1.68 CM (ref 1.5–2)
LEFT KIDNEY LENGTH: 10.84 CM
LEFT RENAL DIST DIAS: 24.4 CENTIMETER/SECOND
LEFT RENAL DIST SYS: 79.9 CENTIMETER/SECOND
LEFT RENAL MID DIAS: 46.3 CM/S
LEFT RENAL MID SYS: 134.8 CENTIMETER/SECOND
LEFT RENAL ORIGIN DIAS: 30.6 CM/S
LEFT RENAL ORIGIN RI: 0.69
LEFT RENAL ORIGIN SYS: 99.5 CM/S
LEFT RENAL PROX DIAS: 47.1 CENTIMETER/SECOND
LEFT RENAL PROX SYS: 121.2 CENTIMETER/SECOND
MID AORTIC AP: 1.5 CM
MID AORTIC TRANS: 1.7 CM
PROX AORTIC AP: 1.78 CM
PROX AORTIC TRANS: 1.81 CM
RIGHT KIDNEY LENGTH: 10.57 CM
RIGHT RENAL DIST DIAS: 56.4 CENTIMETER/SECOND
RIGHT RENAL DIST SYS: 129.3 CENTIMETER/SECOND
RIGHT RENAL MID DIAS: 50.8 CENTIMETER/SECOND
RIGHT RENAL MID SYS: 115.2 CENTIMETER/SECOND
RIGHT RENAL ORIGIN DIAS: 26.7 CM/S
RIGHT RENAL ORIGIN RI: 0.65
RIGHT RENAL ORIGIN SYS: 76.8 CM/S
RIGHT RENAL PROX DIAS: 44.8 CENTIMETER/SECOND
RIGHT RENAL PROX SYS: 114 CENTIMETER/SECOND

## 2021-09-28 PROCEDURE — 93306 TTE W/DOPPLER COMPLETE: CPT | Performed by: SPECIALIST

## 2021-09-28 PROCEDURE — 99214 OFFICE O/P EST MOD 30 MIN: CPT | Performed by: SPECIALIST

## 2021-09-28 PROCEDURE — 93975 VASCULAR STUDY: CPT | Performed by: SPECIALIST

## 2021-09-28 RX ORDER — DILTIAZEM HYDROCHLORIDE 120 MG/1
120 CAPSULE, COATED, EXTENDED RELEASE ORAL DAILY
Qty: 30 CAPSULE | Refills: 12 | Status: SHIPPED | OUTPATIENT
Start: 2021-09-28

## 2021-09-28 NOTE — PROGRESS NOTES
Brooklynn Neff is a 46 y.o. female    Visit Vitals  BP (!) 180/94 (BP 1 Location: Left upper arm, BP Patient Position: Sitting, BP Cuff Size: Adult)   Pulse 92   Wt 143 lb (64.9 kg)   SpO2 99%   BMI 25.33 kg/m²       Chief Complaint   Patient presents with    Hypertension    Coronary Artery Disease       Chest pain NO  SOB NO  Dizziness NO  Swelling NO  Recent hospital visit NO  Refills NO  COVID VACCINE STATUS YES

## 2021-09-28 NOTE — PROGRESS NOTES
Sandip Angulo MD. [facility] [place]              Patient: Saturnino Enrique  : 1980      Today's Date: 2021          HISTORY OF PRESENT ILLNESS:     History of Present Illness:  She is self referred for HTN. Her BP has been 160/100 or higher at home. Has had HA's associated with high BP's. BP has been high for years and gradually increasing. She has been intolerant of a few meds (clonidine caused GI complaints). No CP or SOB. She had been seeing Dr. Tiny Monzon and he made several med changes but her BP has remained high. Dr. Purvi Gray added clonidine patch recently. BP remains high 160/102. PAST MEDICAL HISTORY:     Past Medical History:   Diagnosis Date    CAD (coronary artery disease)     Diabetes ([address]     Dyslipidemia     Hypertension     Resistant hypertension     Vitamin D deficiency        Past Surgical History:   Procedure Laterality Date    HX BREAST REDUCTION      HX CHOLECYSTECTOMY      HX OTHER SURGICAL  2016    stent in artery    HX TOTAL LAPAROSCOPIC HYSTERECTOMY  [id number]         MEDICATIONS:     Current Outpatient Medications   Medication Sig Dispense Refill    cloNIDine (CATAPRES) 0.1 mg/24 hr ptwk 1 Patch by TransDERmal route every seven (7) days.  aspirin 81 mg chewable tablet Take 1 Tablet by mouth daily. 90 Tablet 0    terazosin (HYTRIN) 2 mg capsule Take 2 Capsules by mouth nightly. 20 Capsule 0    evolocumab (REPATHA SURECLICK SC) by SubCUTAneous route every fourteen (14) days.  prasugreL (Effient) 10 mg tablet Take 10 mg by mouth.  azilsartan med-chlorthalidone (Edarbyclor) 40-25 mg per tablet Take  by mouth daily.  spironolactone (ALDACTONE) 50 mg tablet Take 50 mg by mouth daily.  amLODIPine (Norvasc) 10 mg tablet Take 1 Tablet by mouth daily.  30 Tablet 0    insulin lispro (HUMALOG KWIKPEN INSULIN) 100 unit/mL kwikpen Humalog KwikPen (U-100) Insulin 100 unit/mL subcutaneous      nebivolol HCl (BYSTOLIC PO) 40 mg daily.  insulin degludec (TRESIBA U-100 INSULIN) 100 unit/mL soln Tresiba U-100 Insulin      rosuvastatin (CRESTOR) 20 mg tablet Take 40 mg by mouth nightly.  clobetasol (TEMOVATE) 0.05 % ointment Apply  to affected area two (2) times a day. For 1 week, daily x 1 week, every other day x 1 week then start triamcinolone 60 g 1    triamcinolone acetonide (KENALOG) 0.5 % ointment Apply  to affected area two (2) times a day. use thin layer (Patient not taking: Reported on 9/23/2021) 60 g 3       No Known Allergies        SOCIAL HISTORY:     Social History     Tobacco Use    Smoking status: Never Smoker    Smokeless tobacco: Never Used   Substance Use Topics    Alcohol use: Not Currently    Drug use: Not on file       FAMILY HISTORY:     Family History   Problem Relation Age of Onset    Diabetes Mother     Hypertension Mother     Diabetes Father     Hypertension Father            REVIEW OF SYMPTOMS:     Review of Symptoms:  Constitutional: Negative for fever, chills  HEENT: Negative for nosebleeds, tinnitus, and vision changes. Respiratory: Negative for cough, wheezing  Cardiovascular: Negative for orthopnea, claudication, syncope, and PND. Gastrointestinal: Negative for abdominal pain, diarrhea, melena. Genitourinary: Negative for dysuria  Musculoskeletal: Negative for myalgias. Skin: Negative for rash  Heme: No problems bleeding. Neurological: Negative for speech change and focal weakness. PHYSICAL EXAM:     Physical Exam:  Visit Vitals  BP (!) 180/94 (BP 1 Location: Left upper arm, BP Patient Position: Sitting, BP Cuff Size: Adult)   Pulse 92   Wt 143 lb (64.9 kg)   SpO2 99%   BMI 25.33 kg/m²     Patient appears generally well, mood and affect are appropriate and pleasant. HEENT:  Hearing intact, non-icteric, normocephalic, atraumatic. Neck Exam: Supple,    Lung Exam: Clear to auscultation, even breath sounds.    Cardiac Exam: Regular rate and rhythm with no murmur or rub  Abdomen: Soft, non-tender, normal bowel sounds. Extremities: Moves all ext well. No lower extremity edema. MSKTL: Overall good ROM ext  Skin: No significant rashes  Psych: Appropriate affect  Neuro - Grossly intact      LABS / OTHER STUDIES reviewed:     Labs 5/20 - CMP OK, A1c 12.7, LDL 32, chol 115, HDL 68    Lab Results   Component Value Date/Time    Sodium 135 09/22/2021 08:01 AM    Potassium 3.9 09/22/2021 08:01 AM    Chloride 98 09/22/2021 08:01 AM    CO2 24 09/22/2021 08:01 AM    Glucose 400 (H) 09/22/2021 08:01 AM    BUN 7 09/22/2021 08:01 AM    Creatinine 0.62 09/22/2021 08:01 AM    BUN/Creatinine ratio 11 09/22/2021 08:01 AM    GFR est  09/22/2021 08:01 AM    GFR est non- 09/22/2021 08:01 AM    Calcium 9.8 09/22/2021 08:01 AM    Bilirubin, total 0.3 09/22/2021 08:01 AM    Alk. phosphatase 108 09/22/2021 08:01 AM    Protein, total 7.2 09/22/2021 08:01 AM    Albumin 4.7 09/22/2021 08:01 AM    A-G Ratio 1.9 09/22/2021 08:01 AM    ALT (SGPT) 9 09/22/2021 08:01 AM    AST (SGOT) 10 09/22/2021 08:01 AM     Lab Results   Component Value Date/Time    WBC 6.1 09/22/2021 08:01 AM    HGB 14.4 09/22/2021 08:01 AM    HCT 43.6 09/22/2021 08:01 AM    PLATELET 672 61/14/6564 08:01 AM    MCV 88 09/22/2021 08:01 AM       .  Lab Results   Component Value Date/Time    Hemoglobin A1c 13.4 (H) 09/22/2021 08:01 AM       Lab Results   Component Value Date/Time    Cholesterol, total 178 09/22/2021 08:01 AM    HDL Cholesterol 59 09/22/2021 08:01 AM    LDL, calculated 106 (H) 09/22/2021 08:01 AM    VLDL, calculated 13 09/22/2021 08:01 AM    Triglyceride 69 09/22/2021 08:01 AM           CARDIAC DIAGNOSTICS:     Cardiac Evaluation Includes:  I reviewed the results below. EKG 8/27/21 - NSR, normal     ACS admission 5/20. Echo 5/1/20 - LVEF 55-65%  Cath 5/7/20 ([facility]) - patent LAD stent.   Ostial diagonal 70% lesion treated with cutting balloon angioplasty (avoided bifurcating stent which she may need later if more problems). Mild LCX and RCA disease. LVEDP 20-25 mmHg. Renal artery doppler 9/28/21 - normal   Echo 9/28/21 - PRELIM - LVEF 60%      ASSESSMENT AND PLAN:     Assessment and Plan:    1) Resistant HTN  - BP remains poorly controlled despite several meds  - Workup for secondary causes ---> renal artery dopplers and serum metanephrines normal   - unable to check renin/jayson since on aldactone already   - Hydralazine was not effective   - PCP added clonidine patch recently ---> cost is high   - On 9/23/21 - BP remains high ---> Will try increasing terazosin to 4 mg and reassess next week. Consider adding Cardizem later. - Refered to [facility] for further options (renal denervation ?) - has appt 11/19/21   - On 9/28/21 - BP remains high ---> will add Cardizem 120 mg daily -- if BP remains high and HR OK, can double to 240 mg next visit     2) CAD  - She had Stents in May 2019 and October 2019 and balloon angioplasty May 2020  - ACS admission 5/20.  ---> Echo 5/1/20 - LVEF 55-65% ---> Cath 5/7/20 ([address]) - patent LAD stent. Ostial diagonal 70% lesion treated with cutting balloon angioplasty (avoided bifurcating stent which she may need later if more problems). Mild LCX and RCA disease. LVEDP 20-25 mmHg. - cont statin, effient, ASA 81, BB  ---> plan to stop Effient once BP and DM better controlled     3) Dyslipidemia   - cont crestor and Repatha    - labs pending     4) DM  - A1c was 13.4 on 9/22/21 ---> needs better control   - sees Dr. Mercado People     5) See me next week. 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 Rex Olsen MD, [address], [address] [place] [address], [address] [place] [address]  Ph: [phone]   Ph [phone]

## 2023-01-30 ENCOUNTER — APPOINTMENT (OUTPATIENT)
Dept: GENERAL RADIOLOGY | Age: 43
End: 2023-01-30
Attending: EMERGENCY MEDICINE
Payer: COMMERCIAL

## 2023-01-30 ENCOUNTER — APPOINTMENT (OUTPATIENT)
Dept: CT IMAGING | Age: 43
End: 2023-01-30
Attending: EMERGENCY MEDICINE
Payer: COMMERCIAL

## 2023-01-30 ENCOUNTER — HOSPITAL ENCOUNTER (EMERGENCY)
Age: 43
Discharge: HOME | End: 2023-01-30
Attending: EMERGENCY MEDICINE
Payer: COMMERCIAL

## 2023-01-30 VITALS
RESPIRATION RATE: 16 BRPM | TEMPERATURE: 98.2 F | SYSTOLIC BLOOD PRESSURE: 180 MMHG | OXYGEN SATURATION: 100 % | WEIGHT: 150 LBS | DIASTOLIC BLOOD PRESSURE: 11 MMHG | HEART RATE: 109 BPM | BODY MASS INDEX: 26.58 KG/M2 | HEIGHT: 63 IN

## 2023-01-30 DIAGNOSIS — R10.9 RIGHT FLANK PAIN: Primary | ICD-10-CM

## 2023-01-30 DIAGNOSIS — I10 HYPERTENSION, UNSPECIFIED TYPE: ICD-10-CM

## 2023-01-30 DIAGNOSIS — R73.9 HYPERGLYCEMIA: ICD-10-CM

## 2023-01-30 LAB
ALBUMIN SERPL-MCNC: 3.9 G/DL (ref 3.5–5)
ALBUMIN/GLOB SERPL: 1.2 (ref 1.1–2.2)
ALP SERPL-CCNC: 73 U/L (ref 45–117)
ALT SERPL-CCNC: 14 U/L (ref 12–78)
ANION GAP SERPL CALC-SCNC: 9 MMOL/L (ref 5–15)
APPEARANCE UR: CLEAR
AST SERPL-CCNC: 9 U/L (ref 15–37)
BACTERIA URNS QL MICRO: NEGATIVE /HPF
BASOPHILS # BLD: 0 K/UL (ref 0–0.1)
BASOPHILS NFR BLD: 1 % (ref 0–1)
BILIRUB SERPL-MCNC: 0.2 MG/DL (ref 0.2–1)
BILIRUB UR QL: NEGATIVE
BUN SERPL-MCNC: 12 MG/DL (ref 6–20)
BUN/CREAT SERPL: 14 (ref 12–20)
CALCIUM SERPL-MCNC: 9.3 MG/DL (ref 8.5–10.1)
CHLORIDE SERPL-SCNC: 102 MMOL/L (ref 97–108)
CO2 SERPL-SCNC: 28 MMOL/L (ref 21–32)
COLOR UR: ABNORMAL
CREAT SERPL-MCNC: 0.83 MG/DL (ref 0.55–1.02)
DIFFERENTIAL METHOD BLD: ABNORMAL
EOSINOPHIL # BLD: 0.1 K/UL (ref 0–0.4)
EOSINOPHIL NFR BLD: 2 % (ref 0–7)
EPITH CASTS URNS QL MICRO: ABNORMAL /LPF
ERYTHROCYTE [DISTWIDTH] IN BLOOD BY AUTOMATED COUNT: 11.9 % (ref 11.5–14.5)
GLOBULIN SER CALC-MCNC: 3.2 G/DL (ref 2–4)
GLUCOSE SERPL-MCNC: 360 MG/DL (ref 65–100)
GLUCOSE UR STRIP.AUTO-MCNC: >1000 MG/DL
HCG UR QL: NEGATIVE
HCT VFR BLD AUTO: 38.5 % (ref 35–47)
HGB BLD-MCNC: 12.7 G/DL (ref 11.5–16)
HGB UR QL STRIP: NEGATIVE
HYALINE CASTS URNS QL MICRO: ABNORMAL /LPF (ref 0–2)
IMM GRANULOCYTES # BLD AUTO: 0 K/UL (ref 0–0.04)
IMM GRANULOCYTES NFR BLD AUTO: 0 % (ref 0–0.5)
KETONES UR QL STRIP.AUTO: NEGATIVE MG/DL
LEUKOCYTE ESTERASE UR QL STRIP.AUTO: NEGATIVE
LYMPHOCYTES # BLD: 2.9 K/UL (ref 0.8–3.5)
LYMPHOCYTES NFR BLD: 43 % (ref 12–49)
MCH RBC QN AUTO: 28.9 PG (ref 26–34)
MCHC RBC AUTO-ENTMCNC: 33 G/DL (ref 30–36.5)
MCV RBC AUTO: 87.7 FL (ref 80–99)
MONOCYTES # BLD: 0.4 K/UL (ref 0–1)
MONOCYTES NFR BLD: 6 % (ref 5–13)
NEUTS SEG # BLD: 3.3 K/UL (ref 1.8–8)
NEUTS SEG NFR BLD: 48 % (ref 32–75)
NITRITE UR QL STRIP.AUTO: NEGATIVE
NRBC # BLD: 0 K/UL (ref 0–0.01)
NRBC BLD-RTO: 0 PER 100 WBC
PH UR STRIP: 5.5 (ref 5–8)
PLATELET # BLD AUTO: 390 K/UL (ref 150–400)
PMV BLD AUTO: 8.4 FL (ref 8.9–12.9)
POTASSIUM SERPL-SCNC: 3.8 MMOL/L (ref 3.5–5.1)
PROT SERPL-MCNC: 7.1 G/DL (ref 6.4–8.2)
PROT UR STRIP-MCNC: NEGATIVE MG/DL
RBC # BLD AUTO: 4.39 M/UL (ref 3.8–5.2)
RBC #/AREA URNS HPF: ABNORMAL /HPF (ref 0–5)
SODIUM SERPL-SCNC: 139 MMOL/L (ref 136–145)
SP GR UR REFRACTOMETRY: 1.02 (ref 1–1.03)
TROPONIN-HIGH SENSITIVITY: 4 NG/L (ref 0–51)
UA: UC IF INDICATED,UAUC: ABNORMAL
UROBILINOGEN UR QL STRIP.AUTO: 0.2 EU/DL (ref 0.2–1)
WBC # BLD AUTO: 6.8 K/UL (ref 3.6–11)
WBC URNS QL MICRO: ABNORMAL /HPF (ref 0–4)

## 2023-01-30 PROCEDURE — 81025 URINE PREGNANCY TEST: CPT

## 2023-01-30 PROCEDURE — 84484 ASSAY OF TROPONIN QUANT: CPT

## 2023-01-30 PROCEDURE — 99285 EMERGENCY DEPT VISIT HI MDM: CPT

## 2023-01-30 PROCEDURE — 96374 THER/PROPH/DIAG INJ IV PUSH: CPT

## 2023-01-30 PROCEDURE — 81001 URINALYSIS AUTO W/SCOPE: CPT

## 2023-01-30 PROCEDURE — 36415 COLL VENOUS BLD VENIPUNCTURE: CPT

## 2023-01-30 PROCEDURE — 85025 COMPLETE CBC W/AUTO DIFF WBC: CPT

## 2023-01-30 PROCEDURE — 74176 CT ABD & PELVIS W/O CONTRAST: CPT

## 2023-01-30 PROCEDURE — 71046 X-RAY EXAM CHEST 2 VIEWS: CPT

## 2023-01-30 PROCEDURE — 93005 ELECTROCARDIOGRAM TRACING: CPT

## 2023-01-30 PROCEDURE — 80053 COMPREHEN METABOLIC PANEL: CPT

## 2023-01-30 PROCEDURE — 74011250636 HC RX REV CODE- 250/636: Performed by: EMERGENCY MEDICINE

## 2023-01-30 RX ORDER — KETOROLAC TROMETHAMINE 30 MG/ML
15 INJECTION, SOLUTION INTRAMUSCULAR; INTRAVENOUS
Status: COMPLETED | OUTPATIENT
Start: 2023-01-30 | End: 2023-01-30

## 2023-01-30 RX ADMIN — KETOROLAC TROMETHAMINE 15 MG: 30 INJECTION, SOLUTION INTRAMUSCULAR at 16:21

## 2023-01-30 NOTE — ED TRIAGE NOTES
Pt arrives for R sided flank pain that travels around to her abd. States pain started last week  Denies NVD.  No urinary complaints at this time     Hx of cardiac stents and balloon, HTN and DM     States chest pain started Saturday \"its not that bad\"

## 2023-01-30 NOTE — ED PROVIDER NOTES
The history is provided by the patient. Flank Pain   This is a new problem. The current episode started more than 1 week ago. The problem has not changed since onset. The problem occurs constantly. Patient reports not work related injury. The pain is associated with no known injury. The pain is present in the right side. The quality of the pain is described as aching. The pain radiates to the right groin. The pain is moderate. Associated symptoms include chest pain and abdominal pain. Pertinent negatives include no fever, no numbness, no weight loss, no headaches, no abdominal swelling, no bowel incontinence, no perianal numbness, no bladder incontinence, no dysuria, no pelvic pain, no leg pain, no paresthesias, no paresis, no tingling and no weakness. She has tried NSAIDs for the symptoms. The treatment provided no relief.       Past Medical History:   Diagnosis Date    CAD (coronary artery disease)     Diabetes ([facility] [address]     Dyslipidemia     Hypertension     Resistant hypertension     Vitamin D deficiency        Past Surgical History:   Procedure Laterality Date    HX BREAST REDUCTION  2010    HX CHOLECYSTECTOMY  2002    HX OTHER SURGICAL  2016    stent in artery    HX TOTAL LAPAROSCOPIC HYSTERECTOMY  [id number]         Family History:   Problem Relation Age of Onset    Diabetes Mother     Hypertension Mother     Diabetes Father     Hypertension Father        Social History     Socioeconomic History    Marital status: SINGLE     Spouse name: Not on file    Number of children: Not on file    Years of education: Not on file    Highest education level: Not on file   Occupational History    Not on file   Tobacco Use    Smoking status: Never    Smokeless tobacco: Never   Substance and Sexual Activity    Alcohol use: Not Currently    Drug use: Not on file    Sexual activity: Yes     Partners: Male     Birth control/protection: Surgical     Comment: [address]   Other Topics Concern    Not on file   Social History Narrative    Not on file     Social Determinants of Health     Financial Resource Strain: Not on file   Food Insecurity: Not on file   Transportation Needs: Not on file   Physical Activity: Not on file   Stress: Not on file   Social Connections: Not on file   Intimate Partner Violence: Not on file   Housing Stability: Not on file         ALLERGIES: Patient has no known allergies. Review of Systems   Constitutional:  Negative for activity change, chills, fever and weight loss. HENT:  Negative for nosebleeds, sore throat, trouble swallowing and voice change. Eyes:  Negative for visual disturbance. Respiratory:  Negative for shortness of breath. Cardiovascular:  Positive for chest pain. Negative for palpitations. Gastrointestinal:  Positive for abdominal pain. Negative for bowel incontinence, constipation, diarrhea and nausea. Genitourinary:  Positive for flank pain. Negative for bladder incontinence, difficulty urinating, dysuria, hematuria, pelvic pain and urgency. Musculoskeletal:  Negative for back pain, neck pain and neck stiffness. Skin:  Negative for color change. Allergic/Immunologic: Negative for immunocompromised state. Neurological:  Negative for dizziness, tingling, seizures, syncope, weakness, light-headedness, numbness, headaches and paresthesias. Psychiatric/Behavioral:  Negative for behavioral problems, confusion, hallucinations, self-injury and suicidal ideas. Vitals:    01/30/23 1542 01/30/23 1544 01/30/23 1545   BP: (!) 182/105 (!) 179/118 (!) 180/11   Pulse: (!) 109     Resp: 16     Temp: 98.2 °F (36.8 °C)     SpO2: 100%     Weight: 68 kg (150 lb)     Height: 5' 3\" (1.6 m)              Physical Exam  Vitals and nursing note reviewed. Constitutional:       General: She is not in acute distress. Appearance: She is well-developed. She is not diaphoretic. HENT:      Head: Normocephalic and atraumatic. Eyes:      Pupils: Pupils are equal, round, and reactive to light. Cardiovascular:      Rate and Rhythm: Normal rate and regular rhythm. Heart sounds: Normal heart sounds. No murmur heard. No friction rub. No gallop. Pulmonary:      Effort: Pulmonary effort is normal. No respiratory distress. Breath sounds: Normal breath sounds. No wheezing. Abdominal:      General: Bowel sounds are normal. There is no distension. Palpations: Abdomen is soft. Tenderness: There is abdominal tenderness in the right upper quadrant and right lower quadrant. There is no guarding or rebound. Musculoskeletal:         General: Normal range of motion. Cervical back: Normal range of motion and neck supple. Skin:     General: Skin is warm. Findings: No rash. Neurological:      Mental Status: She is alert and oriented to person, place, and time. Psychiatric:         Behavior: Behavior normal.         Thought Content: Thought content normal.         Judgment: Judgment normal.        Medical Decision Making  Amount and/or Complexity of Data Reviewed  Labs: ordered. Radiology: ordered. ECG/medicine tests: ordered. Risk  Prescription drug management. ED Course as of 01/30/23 1603   Mon Jan 30, 2023   1556 EKG interpretation: (Preliminary)  Rhythm: sinus tachycardia; and regular . Rate (approx.): 107; Axis: normal; P wave: normal; QRS interval: normal ; ST/T wave: non-specific changes; Other findings: abnormal ekg   [FD]      ED Course User Index  [FD] Yuan Fulton MD       Procedures    This is a 51-year-old female with past medical history, review of systems, physical exam as above, presenting with complaints of right flank pain, chest pain. Patient states approximately 1 week of right flank pain, radiating into the right groin. She denies similar pain in the past, denies strain or injury, denies a history of renal colic. She denies fevers or chills, nausea or vomiting, diarrhea, constipation, dysuria or hematuria.   She endorses intermittent episodes of substernal chest pain, for the last 2 days noting a history of coronary disease, status post stenting. Upon arrival she is noted to be hypertensive, afebrile mildly tachycardic, this resolved at the time evaluation. She has soft abdomen with tenderness in the right upper right lower quadrant, patient endorses a history of previous cholecystectomy. Discussed with patient differential includes ACS, renal colic, constipation, gas pain. Will provide Toradol, obtain CMP, CBC, UA, cardiac enzymes, EKG, chest x-ray, CT scan of the abdomen and pelvis. We will reassess, and make a disposition. 4:55 PM  Lab work and imaging without acute abnormality, hyperglycemia discussed with patient, who endorses noncompliance with her medications. Discussed with patient better compliance, primary care follow-up, return precautions given. Patient in agreement with plan.

## 2023-01-31 LAB
ATRIAL RATE: 107 BPM
CALCULATED P AXIS, ECG09: 71 DEGREES
CALCULATED R AXIS, ECG10: 37 DEGREES
CALCULATED T AXIS, ECG11: 10 DEGREES
DIAGNOSIS, 93000: NORMAL
P-R INTERVAL, ECG05: 156 MS
Q-T INTERVAL, ECG07: 342 MS
QRS DURATION, ECG06: 68 MS
QTC CALCULATION (BEZET), ECG08: 456 MS
VENTRICULAR RATE, ECG03: 107 BPM